# Patient Record
Sex: MALE | Race: WHITE | URBAN - METROPOLITAN AREA
[De-identification: names, ages, dates, MRNs, and addresses within clinical notes are randomized per-mention and may not be internally consistent; named-entity substitution may affect disease eponyms.]

---
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
## 2024-01-16 ENCOUNTER — OFFICE VISIT (OUTPATIENT)
Dept: INTERNAL MEDICINE CLINIC | Age: 19
End: 2024-01-16
Payer: COMMERCIAL

## 2024-01-16 VITALS
WEIGHT: 272 LBS | DIASTOLIC BLOOD PRESSURE: 80 MMHG | TEMPERATURE: 99 F | HEIGHT: 68 IN | BODY MASS INDEX: 41.22 KG/M2 | SYSTOLIC BLOOD PRESSURE: 138 MMHG | HEART RATE: 76 BPM | OXYGEN SATURATION: 99 %

## 2024-01-16 DIAGNOSIS — H93.13 TINNITUS OF BOTH EARS: ICD-10-CM

## 2024-01-16 DIAGNOSIS — H93.13 TINNITUS OF BOTH EARS: Primary | ICD-10-CM

## 2024-01-16 PROCEDURE — 99203 OFFICE O/P NEW LOW 30 MIN: CPT | Performed by: STUDENT IN AN ORGANIZED HEALTH CARE EDUCATION/TRAINING PROGRAM

## 2024-01-16 SDOH — ECONOMIC STABILITY: HOUSING INSECURITY
IN THE LAST 12 MONTHS, WAS THERE A TIME WHEN YOU DID NOT HAVE A STEADY PLACE TO SLEEP OR SLEPT IN A SHELTER (INCLUDING NOW)?: NO

## 2024-01-16 SDOH — ECONOMIC STABILITY: FOOD INSECURITY: WITHIN THE PAST 12 MONTHS, THE FOOD YOU BOUGHT JUST DIDN'T LAST AND YOU DIDN'T HAVE MONEY TO GET MORE.: NEVER TRUE

## 2024-01-16 SDOH — ECONOMIC STABILITY: INCOME INSECURITY: HOW HARD IS IT FOR YOU TO PAY FOR THE VERY BASICS LIKE FOOD, HOUSING, MEDICAL CARE, AND HEATING?: NOT HARD AT ALL

## 2024-01-16 SDOH — ECONOMIC STABILITY: FOOD INSECURITY: WITHIN THE PAST 12 MONTHS, YOU WORRIED THAT YOUR FOOD WOULD RUN OUT BEFORE YOU GOT MONEY TO BUY MORE.: NEVER TRUE

## 2024-01-16 ASSESSMENT — PATIENT HEALTH QUESTIONNAIRE - PHQ9
SUM OF ALL RESPONSES TO PHQ QUESTIONS 1-9: 0
SUM OF ALL RESPONSES TO PHQ9 QUESTIONS 1 & 2: 0
SUM OF ALL RESPONSES TO PHQ QUESTIONS 1-9: 0
2. FEELING DOWN, DEPRESSED OR HOPELESS: 0
SUM OF ALL RESPONSES TO PHQ QUESTIONS 1-9: 0
1. LITTLE INTEREST OR PLEASURE IN DOING THINGS: 0
SUM OF ALL RESPONSES TO PHQ QUESTIONS 1-9: 0

## 2024-01-16 NOTE — PROGRESS NOTES
Nahun Wolfe (:  2005) is a 18 y.o. male here for evaluation of the following chief complaint(s):  New Patient (Ringing in ears x  1mo.off and on)         ASSESSMENT/PLAN:  1. Tinnitus of both ears  Assessment & Plan:   Rinne and Osman tests did not lateralize.   Given sx > 3-4 weeks, both ear, progressing to daily, will obtain hearing test.   Check labs.   Medication reviewed  Will need to avoid loud noises, reduce use of headphones.   Reassess in a couple of months. Sooner if hearing test is indicated.   Orders:  -     CBC with Auto Differential; Future  -     Comprehensive Metabolic Panel; Future  -     TSH with Reflex; Future  -     [facility] AU.D., [facility], [place]      Return in about 7 weeks (around 3/4/2024) for Routine follow-up, tinnutis.         Subjective   SUBJECTIVE/OBJECTIVE:  SCOTTIE Garnica is a 18-year-old male who presented today with his mom to establish care.  He complains of tendinitis.  Symptoms started for about a months.  Initially he hears a high-pitched noise in his left ear, however now he can hear in both the ears.  Initially intermittent, however the now that he has ringing in the ears everyday.  More prominence when is quiet such as around bedtime.  Denies any hearing impairment.  He reports that he does wear headphones almost every day, several hours per day.  Per mom, she sometimes can hear music, however on his headphones.   Does have a history of recurrent ear infection when he was little.  Not on chronic medications, however reports that he is taking Allegra often for seasonal allergy.  Otherwise, he denies any headache, ear pain recent URI.  He is currently in college, exploring his option, is considering joining the [employer].    Review of Systems:   Constitutional:  Denies fever or chills   Eyes:  Denies change in visual acuity   HENT:  Denies nasal congestion or sore throat   Respiratory:  Denies cough or shortness of breath   Cardiovascular:

## 2024-01-16 NOTE — ASSESSMENT & PLAN NOTE
Rinne and Osman tests did not lateralize.   Given sx > 3-4 weeks, both ear, daily, will obtain hearing test.   Check labs.   Medication reviewed  Will need to avoid loud noises, reduce use of headphones.   Reassess in a couple of months. Sooner if hearing test is indicated.

## 2024-01-17 LAB
ALBUMIN SERPL-MCNC: 5.3 G/DL (ref 3.4–5)
ALBUMIN/GLOB SERPL: 2.4 {RATIO} (ref 1.1–2.2)
ALP SERPL-CCNC: 99 U/L (ref 40–129)
ALT SERPL-CCNC: 18 U/L (ref 10–40)
ANION GAP SERPL CALCULATED.3IONS-SCNC: 15 MMOL/L (ref 3–16)
AST SERPL-CCNC: 13 U/L (ref 15–37)
BASOPHILS # BLD: 0 K/UL (ref 0–0.2)
BASOPHILS NFR BLD: 0.4 %
BILIRUB SERPL-MCNC: 0.7 MG/DL (ref 0–1)
BUN SERPL-MCNC: 12 MG/DL (ref 7–20)
CALCIUM SERPL-MCNC: 10.3 MG/DL (ref 8.3–10.6)
CHLORIDE SERPL-SCNC: 101 MMOL/L (ref 99–110)
CO2 SERPL-SCNC: 26 MMOL/L (ref 21–32)
CREAT SERPL-MCNC: 0.8 MG/DL (ref 0.9–1.3)
DEPRECATED RDW RBC AUTO: 13.7 % (ref 12.4–15.4)
EOSINOPHIL # BLD: 0.1 K/UL (ref 0–0.6)
EOSINOPHIL NFR BLD: 1.3 %
GFR SERPLBLD CREATININE-BSD FMLA CKD-EPI: >60 ML/MIN/{1.73_M2}
GLUCOSE SERPL-MCNC: 103 MG/DL (ref 70–99)
HCT VFR BLD AUTO: 46.5 % (ref 40.5–52.5)
HGB BLD-MCNC: 16 G/DL (ref 13.5–17.5)
LYMPHOCYTES # BLD: 2.2 K/UL (ref 1–5.1)
LYMPHOCYTES NFR BLD: 23.1 %
MCH RBC QN AUTO: 27.9 PG (ref 26–34)
MCHC RBC AUTO-ENTMCNC: 34.4 G/DL (ref 31–36)
MCV RBC AUTO: 81.3 FL (ref 80–100)
MONOCYTES # BLD: 0.6 K/UL (ref 0–1.3)
MONOCYTES NFR BLD: 6.4 %
NEUTROPHILS # BLD: 6.6 K/UL (ref 1.7–7.7)
NEUTROPHILS NFR BLD: 68.8 %
PLATELET # BLD AUTO: 265 K/UL (ref 135–450)
PMV BLD AUTO: 9.3 FL (ref 5–10.5)
POTASSIUM SERPL-SCNC: 4.5 MMOL/L (ref 3.5–5.1)
PROT SERPL-MCNC: 7.5 G/DL (ref 6.4–8.2)
RBC # BLD AUTO: 5.72 M/UL (ref 4.2–5.9)
SODIUM SERPL-SCNC: 142 MMOL/L (ref 136–145)
TSH SERPL DL<=0.005 MIU/L-ACNC: 2.13 UIU/ML (ref 0.43–4)
WBC # BLD AUTO: 9.6 K/UL (ref 4–11)

## 2024-02-02 ENCOUNTER — PROCEDURE VISIT (OUTPATIENT)
Dept: AUDIOLOGY | Age: 19
End: 2024-02-02
Payer: COMMERCIAL

## 2024-02-02 DIAGNOSIS — H93.13 TINNITUS OF BOTH EARS: Primary | ICD-10-CM

## 2024-02-02 DIAGNOSIS — Z01.10 ENCOUNTER FOR EXAMINATION OF EARS AND HEARING WITHOUT ABNORMAL FINDINGS: ICD-10-CM

## 2024-02-02 PROCEDURE — 92567 TYMPANOMETRY: CPT | Performed by: AUDIOLOGIST

## 2024-02-02 PROCEDURE — 92557 COMPREHENSIVE HEARING TEST: CPT | Performed by: AUDIOLOGIST

## 2024-02-02 NOTE — PATIENT INSTRUCTIONS
goes on all the time, you may have tinnitus.    Tinnitus is usually caused by long-term exposure to loud noise. This damages the nerves in the inner ear. It can occur with all types of hearing loss. It may be a symptom of almost any ear problem.  Tinnitus may be caused by a buildup of earwax. Or, it may be caused by ear infections or certain medicines (especially antibiotics or large amounts of aspirin). You can also hear noises in your ears because of an injury to the ears, drinking too much alcohol or caffeine, or a medical condition.  Other conditions may also contribute to tinnitus, including: head and neck trauma, temporomandibular joint disorder (TMJ), sinus pressure and barometric trauma, traumatic brain injury, metabolic disorders, autoimmune disorders, stress, and high blood pressure.    You may need tests to evaluate your hearing and to find causes of long-lasting tinnitus.  Your doctor may suggest one or more treatments to help you cope with the tinnitus. You can also do things at home to help reduce symptoms.    Follow-up care is a key part of your treatment and safety. Be sure to make and go to all appointments, and call your doctor if you are having problems. It's also a good idea to know your test results and keep a list of the medicines you take.    How can you care for yourself at home?  Limit or cut out alcohol, caffeine, and sodium. They can make your symptoms worse.  Do not smoke or use other tobacco products. Nicotine reduces blood flow to the ear and makes tinnitus worse. If you need help quitting, talk to your doctor about stop-smoking programs and medicines. These can increase your chances of quitting for good.  Talk to your doctor about whether to stop taking aspirin and similar products such as ibuprofen or naproxen.  Get exercise often. It can help improve blood flow to the ear.    Ways to manage/cope with tinnitus  Some tinnitus may last a long time. To manage your tinnitus, try

## 2024-02-02 NOTE — PROGRESS NOTES
Nahun Wolfe   2005, 18 y.o. male   [id number]       Referring Provider: Ann Lora MD   Referral Type: In an order in Epic    Reason for Visit: Evaluation of suspected change in hearing, tinnitus, or balance.      ADULT AUDIOLOGIC EVALUATION      Nahun Wolfe is a 18 y.o. male seen today, 2/2/2024, for an initial audiologic evaluation.    AUDIOLOGIC AND OTHER PERTINENT MEDICAL HISTORY:        Nahun Wolfe noted tinnitus bilaterally, started in left ear but not in both ears, initially was intermittent but now occurring every day, noticeable when he thinks about it, but not loud enough to be bothersome; he does wear headphones daily, can be at elevated levels.  He is in [employer].    Nahun Wolfe denied otalgia, aural fullness, otorrhea, dizziness, imbalance, and family history of early onset  hearing loss.    IMPRESSIONS:       Today's results are consistent with hearing sensitivity within normal limits with excellent word recognition for both ears; left ear with hypermobile TM and rightt ear with normal middle ear function.  Discussed tinnitus management strategies and safe listening levels.    ASSESSMENT AND FINDINGS:       Otoscopy revealed: Clear ear canals bilaterally      RIGHT EAR:  Hearing Sensitivity: Within normal limits.  Speech Recognition Threshold: 10 dBHL  Word Recognition: Excellent (100%), based on NU-6 25-word list at 45 dBHL using recorded speech stimuli.    Tympanometry: Normal peak pressure and compliance, Type A tympanogram, consistent with normal middle ear function.      LEFT EAR:  Hearing Sensitivity: Within normal limits.  Speech Recognition Threshold: 10 dBHL  Word Recognition: Excellent (100%), based on NU-6 25-word list at 45 dBHL using recorded speech stimuli.    Tympanometry: Normal peak pressure with high compliance, Type Ad tympanogram, consistent with hypermobile tympanic membrane.      Reliability: Good  Transducer: Inserts    See scanned audiogram dated 2/2/2024 for

## 2024-03-18 ENCOUNTER — OFFICE VISIT (OUTPATIENT)
Dept: INTERNAL MEDICINE CLINIC | Age: 19
End: 2024-03-18
Payer: COMMERCIAL

## 2024-03-18 VITALS
SYSTOLIC BLOOD PRESSURE: 124 MMHG | WEIGHT: 261 LBS | BODY MASS INDEX: 39.56 KG/M2 | TEMPERATURE: 98.6 F | HEIGHT: 68 IN | DIASTOLIC BLOOD PRESSURE: 70 MMHG | HEART RATE: 87 BPM | OXYGEN SATURATION: 97 %

## 2024-03-18 DIAGNOSIS — Z00.00 WELL ADULT EXAM: Primary | ICD-10-CM

## 2024-03-18 DIAGNOSIS — H93.13 TINNITUS OF BOTH EARS: ICD-10-CM

## 2024-03-18 PROCEDURE — 99212 OFFICE O/P EST SF 10 MIN: CPT | Performed by: STUDENT IN AN ORGANIZED HEALTH CARE EDUCATION/TRAINING PROGRAM

## 2024-03-18 NOTE — ASSESSMENT & PLAN NOTE
Overall he is doing well.  Active at baseline.  Is joining a  camp in the summer, medical form filled out today.

## 2024-03-18 NOTE — PROGRESS NOTES
Nahun Wolfe (:  2005) is a 18 y.o. male here for evaluation of the following chief complaint(s):  Follow-up (Ent / physical form)         ASSESSMENT/PLAN:  1. Well adult exam  Assessment & Plan:  Overall he is doing well.  Active at baseline.  Is joining a  camp in the summer, medical form filled out today.  2. Tinnitus of both ears  Assessment & Plan:  Much better since his last visit.  Has reduced exposure to loud noises and headphone use  Hearing test was reassuring      Return if symptoms worsen or fail to improve.         Subjective   SUBJECTIVE/OBJECTIVE:  SCOTTIE Garnica presented today with his mom for follow-up visit.  Overall he is doing better since the last visit.  Ringing sensation in both ear is now much less.  He did have a hearing test, which essentially normal.  He has reduced using headphones, avoid out noises.   He is planning to join a  cam over the summer and needed a physical form filled out today.  Reports that he is active at baseline, every few weeks he would have to run 6 miles, no issues.  Denies any chest pain, shortness of breath, palpitation leg swelling.    Review of Systems:   Constitutional:  Denies fever or chills   Eyes:  Denies change in visual acuity   HENT:  Denies nasal congestion or sore throat   Respiratory:  Denies cough or shortness of breath   Cardiovascular:  Denies chest pain or edema   GI:  Denies abdominal pain, nausea, vomiting, bloody stools or diarrhea   :  Denies dysuria   Musculoskeletal:  Denies back pain or joint pain   Integument:  Denies rash   Neurologic:  Denies headache, focal weakness or sensory changes   Endocrine:  Denies polyuria or polydipsia   Lymphatic:  Denies swollen glands   Psychiatric:  Denies depression or anxiety        Current Outpatient Medications   Medication Sig Dispense Refill    ketoconazole (NIZORAL) 2 % shampoo Apply shampoo three times weekly (M, W, F) leave in 3-5 min, then rinse. May alternate with other

## 2024-03-18 NOTE — ASSESSMENT & PLAN NOTE
Much better since his last visit.  Has reduced exposure to loud noises and headphone use  Hearing test was reassuring

## 2024-04-17 PROBLEM — Z00.00 WELL ADULT EXAM: Status: RESOLVED | Noted: 2024-03-18 | Resolved: 2024-04-17

## 2024-10-15 ENCOUNTER — TELEPHONE (OUTPATIENT)
Dept: INTERNAL MEDICINE CLINIC | Age: 19
End: 2024-10-15

## 2024-10-15 NOTE — TELEPHONE ENCOUNTER
Pts mother is requesting a referral  for the pt to see a new psychiatrists per his requirement for the  for the pts anxiety.    Please call Denise back with information due to pt being in class at college and can not answer the phone. [phone]       
SPOKE WITH ESA NUMBER GIVEN FOR MINDFULLY [phone]  
Patient

## 2024-10-15 NOTE — TELEPHONE ENCOUNTER
Pts mother ronaldo is needing copys of documentation of the pt having psoriasis due to the pt being in the  and reporting all health conditions he has to provide them with all records, notes and imaging that pertain to this condition.     [phone]

## 2024-10-22 ENCOUNTER — OFFICE VISIT (OUTPATIENT)
Dept: INTERNAL MEDICINE CLINIC | Age: 19
End: 2024-10-22
Payer: COMMERCIAL

## 2024-10-22 VITALS
OXYGEN SATURATION: 99 % | SYSTOLIC BLOOD PRESSURE: 122 MMHG | BODY MASS INDEX: 32.39 KG/M2 | HEART RATE: 78 BPM | DIASTOLIC BLOOD PRESSURE: 72 MMHG | WEIGHT: 213 LBS

## 2024-10-22 DIAGNOSIS — Z02.9 ADMINISTRATIVE ENCOUNTER: Primary | ICD-10-CM

## 2024-10-22 PROCEDURE — 99212 OFFICE O/P EST SF 10 MIN: CPT | Performed by: STUDENT IN AN ORGANIZED HEALTH CARE EDUCATION/TRAINING PROGRAM

## 2024-10-22 RX ORDER — FEXOFENADINE HCL 180 MG/1
180 TABLET ORAL DAILY PRN
COMMUNITY

## 2024-10-22 NOTE — PROGRESS NOTES
Nahun Wolfe (:  2005) is a 19 y.o. male here for evaluation of the following chief complaint(s):  Forms (He is here to obtain medical records for the [employer])      Assessment & Plan   ASSESSMENT/PLAN:  Administrative encounter  I discussed with patient that I have not seen him for these diagnoses.  Per chart, looks like he was [facility]  He was advised to contact them for prior records as requested by the [employer]         Subjective   SUBJECTIVE/OBJECTIVE:  SCOTTIE Garnica is here for a follow up visit.   He has planning to  doing [employer] training and has form from [employer] requesting medical record of a previous diagnosis and treatment for psoriasis, anxiety, and dyslexia.       Review of Systems:   Constitutional:  Denies fever or chills   Eyes:  Denies change in visual acuity   HENT:  Denies nasal congestion or sore throat   Respiratory:  Denies cough or shortness of breath   Cardiovascular:  Denies chest pain or edema   GI:  Denies abdominal pain, nausea, vomiting, bloody stools or diarrhea   :  Denies dysuria   Musculoskeletal:  Denies back pain or joint pain   Integument:  Denies rash   Neurologic:  Denies headache, focal weakness or sensory changes   Endocrine:  Denies polyuria or polydipsia   Lymphatic:  Denies swollen glands   Psychiatric:  Denies depression or anxiety        Current Outpatient Medications   Medication Sig Dispense Refill    fexofenadine (ALLEGRA ALLERGY) 180 MG tablet Take 1 tablet by mouth daily as needed      ketoconazole (NIZORAL) 2 % shampoo Apply shampoo three times weekly (M, W, F) leave in 3-5 min, then rinse. May alternate with other shampoos (Patient not taking: Reported on 10/22/2024) 120 mL 11     No current facility-administered medications for this visit.       Objective       Lab Results   Component Value Date    WBC 9.6 2024    HGB 16.0 2024    HCT 46.5 2024

## 2024-10-22 NOTE — PATIENT INSTRUCTIONS
[facility]   [address] [place] [address] [phone] (Work)   [phone] (Fax)     Dermatologist   Karen Irvin MD   [facility] [phone]

## 2025-01-10 NOTE — TELEPHONE ENCOUNTER
It looks like he saw dermatologist before for skin condition.  With able to accept that?  Last office visit with them was back in 2021   none

## 2025-03-25 ENCOUNTER — PATIENT MESSAGE (OUTPATIENT)
Dept: INTERNAL MEDICINE CLINIC | Age: 20
End: 2025-03-25